# Patient Record
Sex: MALE | Race: WHITE | Employment: STUDENT | URBAN - METROPOLITAN AREA
[De-identification: names, ages, dates, MRNs, and addresses within clinical notes are randomized per-mention and may not be internally consistent; named-entity substitution may affect disease eponyms.]

---
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
## 2025-08-20 ENCOUNTER — OFFICE VISIT (OUTPATIENT)
Dept: INTERNAL MEDICINE | Age: 20
End: 2025-08-20
Payer: COMMERCIAL

## 2025-08-20 VITALS
DIASTOLIC BLOOD PRESSURE: 74 MMHG | HEIGHT: 72 IN | BODY MASS INDEX: 33.38 KG/M2 | HEART RATE: 78 BPM | SYSTOLIC BLOOD PRESSURE: 118 MMHG | WEIGHT: 246.4 LBS | OXYGEN SATURATION: 98 % | TEMPERATURE: 97.2 F

## 2025-08-20 DIAGNOSIS — Z02.5 SPORTS PHYSICAL: ICD-10-CM

## 2025-08-20 DIAGNOSIS — Z00.00 ENCOUNTER FOR WELL ADULT EXAM WITHOUT ABNORMAL FINDINGS: Primary | ICD-10-CM

## 2025-08-20 PROBLEM — S06.9XAA TBI (TRAUMATIC BRAIN INJURY) (HCC): Status: RESOLVED | Noted: 2018-03-19 | Resolved: 2025-08-20

## 2025-08-20 PROBLEM — R55 SYNCOPE AND COLLAPSE: Status: RESOLVED | Noted: 2018-03-19 | Resolved: 2025-08-20

## 2025-08-20 PROBLEM — S01.01XA SCALP LACERATION: Status: RESOLVED | Noted: 2025-07-27 | Resolved: 2025-08-20

## 2025-08-20 PROBLEM — R55 SYNCOPE AND COLLAPSE: Status: ACTIVE | Noted: 2018-03-19

## 2025-08-20 PROBLEM — S06.9XAA TBI (TRAUMATIC BRAIN INJURY) (HCC): Status: ACTIVE | Noted: 2018-03-19

## 2025-08-20 PROBLEM — S62.514A CLOSED NONDISPLACED FRACTURE OF PROXIMAL PHALANX OF RIGHT THUMB: Status: RESOLVED | Noted: 2018-03-22 | Resolved: 2025-08-20

## 2025-08-20 PROBLEM — S01.01XA SCALP LACERATION: Status: ACTIVE | Noted: 2025-07-27

## 2025-08-20 PROBLEM — S62.514A CLOSED NONDISPLACED FRACTURE OF PROXIMAL PHALANX OF RIGHT THUMB: Status: ACTIVE | Noted: 2018-03-22

## 2025-08-20 PROCEDURE — 99395 PREV VISIT EST AGE 18-39: CPT | Performed by: STUDENT IN AN ORGANIZED HEALTH CARE EDUCATION/TRAINING PROGRAM

## 2025-08-20 SDOH — ECONOMIC STABILITY: FOOD INSECURITY: WITHIN THE PAST 12 MONTHS, YOU WORRIED THAT YOUR FOOD WOULD RUN OUT BEFORE YOU GOT MONEY TO BUY MORE.: NEVER TRUE

## 2025-08-20 SDOH — ECONOMIC STABILITY: FOOD INSECURITY: WITHIN THE PAST 12 MONTHS, THE FOOD YOU BOUGHT JUST DIDN'T LAST AND YOU DIDN'T HAVE MONEY TO GET MORE.: NEVER TRUE

## 2025-08-20 ASSESSMENT — PATIENT HEALTH QUESTIONNAIRE - PHQ9
SUM OF ALL RESPONSES TO PHQ QUESTIONS 1-9: 0
2. FEELING DOWN, DEPRESSED OR HOPELESS: NOT AT ALL
1. LITTLE INTEREST OR PLEASURE IN DOING THINGS: NOT AT ALL